# Patient Record
Sex: MALE | Race: WHITE | NOT HISPANIC OR LATINO | Employment: STUDENT | URBAN - METROPOLITAN AREA
[De-identification: names, ages, dates, MRNs, and addresses within clinical notes are randomized per-mention and may not be internally consistent; named-entity substitution may affect disease eponyms.]

---

## 2018-11-13 ENCOUNTER — HOSPITAL ENCOUNTER (EMERGENCY)
Facility: MEDICAL CENTER | Age: 23
End: 2018-11-13
Attending: EMERGENCY MEDICINE
Payer: COMMERCIAL

## 2018-11-13 VITALS
HEART RATE: 52 BPM | TEMPERATURE: 97.9 F | WEIGHT: 153 LBS | DIASTOLIC BLOOD PRESSURE: 67 MMHG | SYSTOLIC BLOOD PRESSURE: 112 MMHG | BODY MASS INDEX: 21.42 KG/M2 | OXYGEN SATURATION: 96 % | HEIGHT: 71 IN | RESPIRATION RATE: 18 BRPM
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
## 2018-11-13 DIAGNOSIS — S39.012A STRAIN OF LUMBAR REGION, INITIAL ENCOUNTER: ICD-10-CM

## 2018-11-13 PROCEDURE — 99284 EMERGENCY DEPT VISIT MOD MDM: CPT

## 2018-11-13 RX ORDER — NAPROXEN 500 MG/1
500 TABLET ORAL 2 TIMES DAILY WITH MEALS
Qty: 60 TAB | Refills: 0 | Status: SHIPPED | OUTPATIENT
Start: 2018-11-13

## 2018-11-13 ASSESSMENT — PAIN DESCRIPTION - DESCRIPTORS: DESCRIPTORS: ACHING

## 2018-11-13 ASSESSMENT — PAIN SCALES - GENERAL: PAINLEVEL_OUTOF10: 5

## 2018-11-14 NOTE — ED NOTES
Pt provided discharge instructions, pt verbalized understanding of discharge instructions. Pt alert and oriented x4, gcs15, VSS, resp even and nl. Pt provided RX x1. Pt verbalized understanding of RX. Pt ambulates to exit with steady gait.
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
## 2018-11-14 NOTE — ED NOTES
Pt ambulates to room with steady gait, agree with triage notes. A&Ox4, CMS intact, no other complaints

## 2020-02-28 ENCOUNTER — APPOINTMENT (OUTPATIENT)
Dept: RADIOLOGY | Facility: IMAGING CENTER | Age: 25
End: 2020-02-28
Attending: FAMILY MEDICINE
Payer: COMMERCIAL

## 2020-02-28 ENCOUNTER — OFFICE VISIT (OUTPATIENT)
Dept: URGENT CARE | Facility: CLINIC | Age: 25
End: 2020-02-28
Payer: COMMERCIAL

## 2020-02-28 VITALS
DIASTOLIC BLOOD PRESSURE: 58 MMHG | RESPIRATION RATE: 12 BRPM | HEART RATE: 55 BPM | TEMPERATURE: 98.6 F | OXYGEN SATURATION: 98 % | WEIGHT: 153.4 LBS | SYSTOLIC BLOOD PRESSURE: 100 MMHG | BODY MASS INDEX: 21.48 KG/M2 | HEIGHT: 71 IN

## 2020-02-28 DIAGNOSIS — R07.81 RIB PAIN ON RIGHT SIDE: ICD-10-CM

## 2020-02-28 DIAGNOSIS — M94.0 COSTOCHONDRITIS: ICD-10-CM

## 2020-02-28 PROCEDURE — 71046 X-RAY EXAM CHEST 2 VIEWS: CPT | Mod: TC | Performed by: FAMILY MEDICINE

## 2020-02-28 PROCEDURE — 99204 OFFICE O/P NEW MOD 45 MIN: CPT | Performed by: FAMILY MEDICINE

## 2020-02-28 SDOH — HEALTH STABILITY: MENTAL HEALTH: HOW OFTEN DO YOU HAVE 6 OR MORE DRINKS ON ONE OCCASION?: WEEKLY

## 2020-02-29 NOTE — PROGRESS NOTES
"Subjective:         Chief Complaint   Patient presents with   • Abdominal Pain     x 12 days.  Pt. states he is having pain in his R labdomen around his lung/rib pain that is constant.  He said its worse when he takes a deep breath.  He was sick with flu symptoms 2 weeks ago.  Denies cough, congestion, fever or chills.                  Rib Pain       Pt c/o dull, achy, intermittent right lower ribcage pain x 12 days.    Pain is constant and worse with deep breathing.       Pain not improved with tylenol.        denies dyspnea.       Pertinent negatives include no claudication, cough, exertional chest pressure, fever, nausea, near-syncope, numbness, syncope or vomiting.        Past medical history was unremarkable and not pertinent to current issue      Social History     Tobacco Use   • Smoking status: Former Smoker   • Smokeless tobacco: Never Used   Substance Use Topics   • Alcohol use: Yes     Alcohol/week: 1.8 oz     Types: 3 Cans of beer per week     Binge frequency: Weekly     Comment: 3 drinks per week   • Drug use: Not Currently     Comment: marijuana         Family history was reviewed and not pertinent       Review of Systems:  Review of Systems   Constitutional: Negative for fever.   HENT: no neck pain, headache or dizziness  Eyes: denies vision changes  Respiratory: no cough, congestion, SOB  Cardiovascular: denies palpations     Gastrointestinal: denies diarrhea, abdominal pain or constipation.  No blood in stool.  Musculoskeletal: denies back pain or joint pain    Skin: no itching or rash  Neurological: No numbness or tingling.   10 point ROS otherwise negative, except per HPI         Objective:     BP (!) 92/56   Pulse (!) 53   Temp 37 °C (98.6 °F) (Temporal)   Resp 12   Ht 1.803 m (5' 11\")   Wt 69.6 kg (153 lb 6.4 oz)   SpO2 98%       Physical Exam   Constitutional: pt is oriented to person, place, and time. Pt appears well-developed and well-nourished.   HENT:   Head: Normocephalic and " atraumatic.   Mouth/Throat: Oropharynx is clear and moist.   Eyes: Conjunctivae and EOM are normal. Pupils are equal, round, and reactive to light. No scleral icterus.   Neck: Normal range of motion. Neck supple. No JVD present. No thyromegaly present.   Cardiovascular: Normal rate, regular rhythm, normal heart sounds and intact distal pulses.  Exam reveals no gallop and no friction rub.    No murmur heard.  Pulmonary/Chest: Effort normal and breath sounds normal. No respiratory distress. Pt has no wheezes. Pt has no rales. Pt exhibits point tenderness over several lower ribs on right side   Abdominal: Soft.   Musculoskeletal: Normal range of motion. Pt exhibits no edema.   Lymphadenopathy:     Pt has no cervical adenopathy.   Neurological: pt is alert and oriented to person, place, and time. No cranial nerve deficit.   Skin: Skin is warm and dry. No erythema.   Psychiatric: pt has a normal mood and affect. patient's behavior is normal.     DX-CHEST-2 VIEWS   Order: [id number]   Status:  Final result   Visible to patient:  No (Not Released) Next appt:  None Dx:  Costochondritis; Rib pain on right side   Details     Reading Physician Reading Date Result Priority   David Christine M.D.  [phone] 2/28/2020 Urgent [facility]      Narrative & Impression        2/28/2020 6:15 PM     HISTORY/REASON FOR EXAM:  Right rib pain.     TECHNIQUE/EXAM DESCRIPTION AND NUMBER OF VIEWS:  Two views of the chest.     COMPARISON: None.     FINDINGS:  There is no evidence of focal consolidation or evidence of pulmonary edema.  The heart is normal in size.  There is no evidence of pleural effusion.  Soft tissues and bony structures are unremarkable.        IMPRESSION:     No evidence of acute cardiopulmonary process.                Assessment/Plan:          Rib pain on right side   Chest x-ray was personally interpreted and reviewed. No acute cardiopulmonary findings. No pulmonary infiltrates or densities. Cardiac silhouette is normal. No  hemidiaphragm elevation. No bony abnormalities.    Likely due to costochondritis       - Diclofenac Sodium (VOLTAREN) 1 % Gel; Apply 4 g to skin as directed 3 times a day as needed.  Dispense: 1 Tube; Refill: 0     Follow up in one week if no improvement, sooner if symptoms worsen.         Low blood pressure - pt denies dizziness or vision changes or near-syncope  Advised f/u with PCP

## 2020-04-23 ENCOUNTER — TELEPHONE (OUTPATIENT)
Dept: SCHEDULING | Facility: IMAGING CENTER | Age: 25
End: 2020-04-23